# Patient Record
Sex: FEMALE | Race: WHITE | URBAN - METROPOLITAN AREA
[De-identification: names, ages, dates, MRNs, and addresses within clinical notes are randomized per-mention and may not be internally consistent; named-entity substitution may affect disease eponyms.]

---
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
## 2024-06-03 NOTE — PROCEDURE: MEDICATION COUNSELING
[facility] Department of Anesthesiology  Pre-Anesthesia Evaluation/Consultation       Name:  Anne Gillette  : 2001  Age:  22 y.o.                                           MRN:  [id number]  Date: 6/3/2024           Surgeon: Surgeon(s):  Alan Salinas MD    Procedure: Procedure(s):  RIGHT ANKLE SYNDESMOSIS SCREW REMOVAL     Allergies   Allergen Reactions    Bactrim [Sulfamethoxazole-Trimethoprim] Hives     Patient Active Problem List   Diagnosis    Closed trimalleolar fracture of right ankle    Syndesmotic disruption of right ankle    Ankle syndesmosis disruption, right, initial encounter     History reviewed. No pertinent past medical history.  Past Surgical History:   Procedure Laterality Date    ANKLE FRACTURE SURGERY Right 2024    OPEN REDUCTION INTERNAL FIXATION RIGHT ANKLE FRACTURE performed by Alan Salinas MD at [facility] OR    WISDOM TOOTH EXTRACTION       Social History     Tobacco Use    Smoking status: Never    Smokeless tobacco: Never   Vaping Use    Vaping Use: Never used   Substance Use Topics    Alcohol use: Not Currently     Comment: special occasions    Drug use: Never     Medications  No current facility-administered medications on file prior to encounter.     Current Outpatient Medications on File Prior to Encounter   Medication Sig Dispense Refill    Norethin Ace-Eth Estrad-FE ([place] 24 FE) 1-20 MG-MCG(24) TABS Take 1 tablet by mouth daily       Current Facility-Administered Medications   Medication Dose Route Frequency Provider Last Rate Last Admin    sodium chloride flush 0.9 % injection 5-40 mL  5-40 mL IntraVENous 2 times per day Annabelle Quinn MD        sodium chloride flush 0.9 % injection 5-40 mL  5-40 mL IntraVENous PRN Annabelle Quinn MD        0.9 % sodium chloride infusion   IntraVENous PRN Annabelle Quinn MD        ceFAZolin (ANCEF) 2,000 mg in sodium chloride 0.9 % 50 mL IVPB (mini-bag)  2,000 mg IntraVENous On Call to OR Alan Salinas MD      Dupixent Counseling: I discussed with the patient the risks of dupilumab including but not limited to eye inflammation and irritation, cold sores, injection site reactions, allergic reactions and increased risk of parasitic infection. The patient understands that monitoring is required and they must alert us or the primary physician if symptoms of infection or other concerning signs are noted.

## 2025-04-08 NOTE — PROCEDURE: MEDICATION COUNSELING
Addended by: ROGER GREGORY on: 4/8/2025 01:55 PM     Modules accepted: Orders     5-Fu Counseling: 5-Fluorouracil Counseling:  I discussed with the patient the risks of 5-fluorouracil including but not limited to erythema, scaling, itching, weeping, crusting, and pain.
